# Patient Record
(demographics unavailable — no encounter records)

---

## 2025-04-01 NOTE — IMAGING
[Right] : right knee [All Views] : anteroposterior, lateral, skyline, and anteroposterior standing [There are no fractures, subluxations or dislocations. No significant abnormalities are seen] : There are no fractures, subluxations or dislocations. No significant abnormalities are seen [FreeTextEntry9] : independent interpretation on 03/27/2025

## 2025-04-01 NOTE — PHYSICAL EXAM
[Right] : right knee [NL (140)] : flexion 140 degrees [NL (0)] : extension 0 degrees [5___] : hamstring 5[unfilled]/5 [Positive] : positive Kristie [] : no pain with varus stress

## 2025-04-01 NOTE — HISTORY OF PRESENT ILLNESS
[de-identified] : 03/27/2025 Ms. DARIEN CARTWRIGHT, a 23 year old ([place] [employer] female, presents today for right knee. Reports history of intermittent right knee pain with acute exacerbation of right knee pain over the past 2 days. She reports worsening right knee pain, stiffness, swelling and has experienced buckling episodes Reports history of a fall approx. 5 years ago and underwent an MRI with a bone contusion and without any tearing seen,

## 2025-04-01 NOTE — DISCUSSION/SUMMARY
[de-identified] : 23f with medial right knee pain and instability.  The patient was advised of the diagnosis. The natural history of the pathology was explained in full to the patient in layman's terms. All questions were answered.  1) MRI right knee, eval meniscal tear 2) cryotherapy, rest and activity modification 3) rtc after MRI   Entered by Coleen Hernandez acting as scribe. Dr. Shirley- The documentation recorded by the scribe accurately reflects the service I personally performed and the decisions made by me.

## 2025-04-01 NOTE — HISTORY OF PRESENT ILLNESS
[de-identified] : 03/27/2025 Ms. DARIEN CARTWRIGHT, a 23 year old [place] [employer] female, presents today for right knee. Reports history of intermittent right knee pain with acute exacerbation of right knee pain over the past 2 days. She reports worsening right knee pain, stiffness, swelling and has experienced buckling episodes Reports history of a fall approx. 5 years ago and underwent an MRI with a bone contusion and without any tearing seen,

## 2025-04-01 NOTE — DISCUSSION/SUMMARY
[de-identified] : 23f with medial right knee pain and instability.  The patient was advised of the diagnosis. The natural history of the pathology was explained in full to the patient in layman's terms. All questions were answered.  1) MRI right knee, eval meniscal tear 2) cryotherapy, rest and activity modification 3) rtc after MRI   Entered by Coleen Hernandez acting as scribe. Dr. Shirley- The documentation recorded by the scribe accurately reflects the service I personally performed and the decisions made by me.

## 2025-04-14 NOTE — DATA REVIEWED
[MRI] : MRI [Right] : of the right [Knee] : knee [FreeTextEntry1] : 4/1/25 [facility] - independent interpretation on 04/08/2025:  stress reaction of the central and inferior aspects of the patella without chondral defect. No meniscal or ligament tear. Slight chronic ACL sprain and mild to moderate popliteal cyst.

## 2025-04-14 NOTE — DISCUSSION/SUMMARY
[de-identified] : 23f with MRI of the right knee showing stress reaction of the central and inferior aspects of the patella without chondral defect. No meniscal or ligament tear. Slight chronic ACL sprain and mild to moderate popliteal cyst.   The MRI results and images were reviewed with the patient. The patient was advised of the diagnosis. The natural history of the pathology was explained in full to the patient in layman's terms. All questions were answered.  1) reviewed activity and exercise modifications 2) cryotherapy, rest, supportive shoes 3) nsaids prn - gi precautions reviewed 4) c/w hep  5) rtc 6 weeks  Entered by Coleen Hernandez acting as scribe. Dr. Shirley- The documentation recorded by the scribe accurately reflects the service I personally performed and the decisions made by me.

## 2025-04-14 NOTE — HISTORY OF PRESENT ILLNESS
[] : yes [de-identified] : 4/8/25: Here to f/up right knee and review MRI results. Pain has remained the same since last visit. Denies n/t. Cont stiffness and pain. 03/27/2025 Ms. 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 Reports history of intermittent right knee pain with acute exacerbation of right knee pain over the past 2 days. She reports worsening right knee pain, stiffness, swelling and has experienced buckling episodes Reports history of a fall approx. 5 years ago and underwent an MRI with a bone contusion and without any tearing seen,     [de-identified] : MRI Right knee [facility]  04/01/2025

## 2025-04-14 NOTE — DISCUSSION/SUMMARY
[de-identified] : 23f with MRI of the right knee showing stress reaction of the central and inferior aspects of the patella without chondral defect. No meniscal or ligament tear. Slight chronic ACL sprain and mild to moderate popliteal cyst.   The MRI results and images were reviewed with the patient. The patient was advised of the diagnosis. The natural history of the pathology was explained in full to the patient in layman's terms. All questions were answered.  1) reviewed activity and exercise modifications 2) cryotherapy, rest, supportive shoes 3) nsaids prn - gi precautions reviewed 4) c/w hep  5) rtc 6 weeks  Entered by Coleen Hernandez acting as scribe. Dr. Shirley- The documentation recorded by the scribe accurately reflects the service I personally performed and the decisions made by me.

## 2025-04-14 NOTE — DISCUSSION/SUMMARY
[de-identified] : 23f with MRI of the right knee showing stress reaction of the central and inferior aspects of the patella without chondral defect. No meniscal or ligament tear. Slight chronic ACL sprain and mild to moderate popliteal cyst.   The MRI results and images were reviewed with the patient. The patient was advised of the diagnosis. The natural history of the pathology was explained in full to the patient in layman's terms. All questions were answered.  1) reviewed activity and exercise modifications 2) cryotherapy, rest, supportive shoes 3) nsaids prn - gi precautions reviewed 4) c/w hep  5) rtc 6 weeks  Entered by Coleen Hernandez acting as scribe. Dr. Shirley- The documentation recorded by the scribe accurately reflects the service I personally performed and the decisions made by me.

## 2025-04-14 NOTE — HISTORY OF PRESENT ILLNESS
[] : yes [de-identified] : 4/8/25: Here to f/up right knee and review MRI results. Pain has remained the same since last visit. Denies n/t. Cont stiffness and pain. 03/27/2025 Ms. 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 Reports history of intermittent right knee pain with acute exacerbation of right knee pain over the past 2 days. She reports worsening right knee pain, stiffness, swelling and has experienced buckling episodes Reports history of a fall approx. 5 years ago and underwent an MRI with a bone contusion and without any tearing seen,     [de-identified] : MRI Right knee [facility]  04/01/2025

## 2025-04-14 NOTE — HISTORY OF PRESENT ILLNESS
[] : yes [de-identified] : 4/8/25: Here to f/up right knee and review MRI results. Pain has remained the same since last visit. Denies n/t. Cont stiffness and pain. 03/27/2025 Ms. 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 Reports history of intermittent right knee pain with acute exacerbation of right knee pain over the past 2 days. She reports worsening right knee pain, stiffness, swelling and has experienced buckling episodes Reports history of a fall approx. 5 years ago and underwent an MRI with a bone contusion and without any tearing seen,     [de-identified] : MRI Right knee [facility]  04/01/2025